# Patient Record
Sex: FEMALE | Race: WHITE
[De-identification: names, ages, dates, MRNs, and addresses within clinical notes are randomized per-mention and may not be internally consistent; named-entity substitution may affect disease eponyms.]

---

## 2017-04-24 ENCOUNTER — HOSPITAL ENCOUNTER (OUTPATIENT)
Dept: HOSPITAL 53 - M OPP | Age: 52
Discharge: HOME | End: 2017-04-24
Attending: INTERNAL MEDICINE
Payer: OTHER GOVERNMENT

## 2017-04-24 VITALS — HEIGHT: 65.5 IN | WEIGHT: 202 LBS | BODY MASS INDEX: 33.25 KG/M2

## 2017-04-24 VITALS — SYSTOLIC BLOOD PRESSURE: 116 MMHG | DIASTOLIC BLOOD PRESSURE: 83 MMHG

## 2017-04-24 DIAGNOSIS — K64.8: ICD-10-CM

## 2017-04-24 DIAGNOSIS — G47.30: ICD-10-CM

## 2017-04-24 DIAGNOSIS — Z79.899: ICD-10-CM

## 2017-04-24 DIAGNOSIS — J44.9: ICD-10-CM

## 2017-04-24 DIAGNOSIS — R06.83: ICD-10-CM

## 2017-04-24 DIAGNOSIS — Z12.11: Primary | ICD-10-CM

## 2017-04-24 DIAGNOSIS — E78.5: ICD-10-CM

## 2017-04-24 PROCEDURE — 99156 MOD SED OTH PHYS/QHP 5/>YRS: CPT

## 2017-04-24 NOTE — ROOR
________________________________________________________________________________

Patient Name: Megan Elena               Procedure Date: 4/24/2017 8:34 AM

MRN: [id number]                          Account Number: [id number]

YOB: 1965              Age: 51

Room: [facility]                            Gender: Female

Note Status: Finalized                 

________________________________________________________________________________

 

Procedure:           Colonoscopy

Indications:         Screening for colorectal malignant neoplasm

Providers:           Shayne ESTES MD

Referring MD:        Gabriella Jones Do, GUTHRIE GUTHRIE, MD

Requesting Provider: 

Medicines:           Monitored Anesthesia Care

Complications:       No immediate complications.

________________________________________________________________________________

Procedure:           Pre-Anesthesia Assessment:

                     - The heart rate, respiratory rate, oxygen saturations, 

                     blood pressure, adequacy of pulmonary ventilation, and 

                     response to care were monitored throughout the procedure.

                     The Colonoscope was introduced through the anus and 

                     advanced to the cecum, identified by appendiceal orifice 

                     and ileocecal valve. The colonoscopy was performed 

                     without difficulty. The patient tolerated the procedure 

                     well. The quality of the bowel preparation was good.

                                                                                

Findings:

     The perianal and digital rectal examinations were normal.

     (Exam: Complete, Prep: Good or Excellent.)

     Internal hemorrhoids were found during retroflexion. The hemorrhoids were 

     small.

     The entire examined colon appeared normal on direct and retroflexion 

     views.

                                                                                

Impression:          - Internal hemorrhoids.

                     - The entire colon is normal on direct and retroflexion 

                     views.

                     - No specimens collected.

Recommendation:      - Repeat colonoscopy in 10 years for screening purposes.

                                                                                

 

Shayne Estes MD

________________

Shayne ESTES MD

4/24/2017 8:50:11 AM

This report has been signed electronically.

Number of Addenda: 0

 

Note Initiated On: 4/24/2017 8:34 AM

Estimated Blood Loss:

     Estimated blood loss: none.

## 2018-08-07 ENCOUNTER — HOSPITAL ENCOUNTER (OUTPATIENT)
Dept: HOSPITAL 53 - M RAD | Age: 53
End: 2018-08-07
Attending: PHYSICIAN ASSISTANT
Payer: COMMERCIAL

## 2018-08-07 DIAGNOSIS — Z12.31: Primary | ICD-10-CM

## 2018-08-07 PROCEDURE — 77067 SCR MAMMO BI INCL CAD: CPT

## 2020-09-02 ENCOUNTER — HOSPITAL ENCOUNTER (OUTPATIENT)
Dept: HOSPITAL 53 - M WHC | Age: 55
End: 2020-09-02
Attending: FAMILY MEDICINE
Payer: COMMERCIAL

## 2020-09-02 DIAGNOSIS — Z92.0: ICD-10-CM

## 2020-09-02 DIAGNOSIS — Z80.42: ICD-10-CM

## 2020-09-02 DIAGNOSIS — Z80.8: ICD-10-CM

## 2020-09-02 DIAGNOSIS — Z78.0: ICD-10-CM

## 2020-09-02 DIAGNOSIS — Z12.31: Primary | ICD-10-CM

## 2020-09-02 NOTE — REPMRS
Patient History

The patient states she has not had a clinical breast exam in over

a year.

Patient is postmenopausal.

Family history of prostate cancer at age 50 or over in father, 

unknown cancer at age 16 in son.

Took hormonal contraceptives for 16 years.

 

3D TOMOSYNTHESIS WAS PERFORMED.

 

The [facility] lifetime risk for breast cancer is 7.1%.

 

VOLPARA DENSITY A.

 

Digital Woman Screen Mammo: September 2, 2020 - Exam #: 

[id number]

Bilateral CC and MLO view(s) were taken.

 

Technologist: RT Lorie

Prior study comparison: August 7, 2018, bilateral digital mammo 

screening bilat, performed at [facility].  August 19, 2016, bilateral digital mammo screening bilat, performed at 

[facility].

 

FINDINGS: There are scattered fibroglandular densities.  There 

has been no change in the appearance of the mammogram from the 

prior studies.  There is a mild amount of residual fibroglandular

tissue which is fairly symmetric. There is no interval 

development of dominant mass, architectural distortion, or 

clustered microcalcification suggestive of malignancy.

 

Assessment: BI-RADS/ACR category 1 mammogram. Negative Mammogram.

 

Recommendation

Routine screening mammogram in 1 year (for women over age 40).

This mammogram was interpreted with the aid of an FDA-approved 

computer-aided dectection system.

 

Electronically Signed By: Paul Contreras MD 09/02/20 9096

## 2022-08-05 ENCOUNTER — HOSPITAL ENCOUNTER (OUTPATIENT)
Dept: HOSPITAL 53 - M WHC | Age: 57
End: 2022-08-05
Attending: STUDENT IN AN ORGANIZED HEALTH CARE EDUCATION/TRAINING PROGRAM
Payer: COMMERCIAL

## 2022-08-05 DIAGNOSIS — Z12.31: Primary | ICD-10-CM

## 2022-08-30 ENCOUNTER — HOSPITAL ENCOUNTER (OUTPATIENT)
Dept: HOSPITAL 53 - M RAD | Age: 57
End: 2022-08-30
Attending: STUDENT IN AN ORGANIZED HEALTH CARE EDUCATION/TRAINING PROGRAM
Payer: COMMERCIAL

## 2022-08-30 DIAGNOSIS — Z12.2: Primary | ICD-10-CM

## 2022-08-30 DIAGNOSIS — F17.210: ICD-10-CM

## 2024-08-09 ENCOUNTER — HOSPITAL ENCOUNTER (OUTPATIENT)
Dept: HOSPITAL 53 - M RAD | Age: 59
End: 2024-08-09
Attending: STUDENT IN AN ORGANIZED HEALTH CARE EDUCATION/TRAINING PROGRAM
Payer: COMMERCIAL

## 2024-08-09 DIAGNOSIS — Z12.2: Primary | ICD-10-CM

## 2024-08-09 DIAGNOSIS — Z72.0: ICD-10-CM

## 2024-08-28 ENCOUNTER — HOSPITAL ENCOUNTER (OUTPATIENT)
Dept: HOSPITAL 53 - M WHC | Age: 59
End: 2024-08-28
Attending: STUDENT IN AN ORGANIZED HEALTH CARE EDUCATION/TRAINING PROGRAM
Payer: COMMERCIAL

## 2024-08-28 DIAGNOSIS — Z12.31: Primary | ICD-10-CM
